# Patient Record
Sex: MALE | Race: WHITE
[De-identification: names, ages, dates, MRNs, and addresses within clinical notes are randomized per-mention and may not be internally consistent; named-entity substitution may affect disease eponyms.]

---

## 2018-02-23 ENCOUNTER — HOSPITAL ENCOUNTER (OUTPATIENT)
Dept: HOSPITAL 31 - C.SDS | Age: 53
Discharge: HOME | End: 2018-02-23
Attending: SURGERY
Payer: MEDICAID

## 2018-02-23 VITALS
TEMPERATURE: 98 F | HEART RATE: 81 BPM | DIASTOLIC BLOOD PRESSURE: 70 MMHG | RESPIRATION RATE: 16 BRPM | SYSTOLIC BLOOD PRESSURE: 100 MMHG

## 2018-02-23 VITALS — OXYGEN SATURATION: 100 %

## 2018-02-23 DIAGNOSIS — K40.20: Primary | ICD-10-CM

## 2018-02-23 PROCEDURE — 49525 REPAIR ING HERNIA SLIDING: CPT

## 2018-02-23 PROCEDURE — 64488 TAP BLOCK BI INJECTION: CPT

## 2018-02-23 RX ADMIN — BUPIVACAINE HYDROCHLORIDE ONE ML: 2.5 INJECTION, SOLUTION EPIDURAL; INFILTRATION; INTRACAUDAL; PERINEURAL at 08:10

## 2018-02-23 RX ADMIN — LIDOCAINE HYDROCHLORIDE AND EPINEPHRINE ONE ML: 10; 10 INJECTION, SOLUTION INFILTRATION; PERINEURAL at 07:52

## 2018-02-23 RX ADMIN — BUPIVACAINE HYDROCHLORIDE ONE ML: 2.5 INJECTION, SOLUTION EPIDURAL; INFILTRATION; INTRACAUDAL; PERINEURAL at 09:52

## 2018-02-23 RX ADMIN — BUPIVACAINE HYDROCHLORIDE ONE ML: 2.5 INJECTION, SOLUTION EPIDURAL; INFILTRATION; INTRACAUDAL; PERINEURAL at 08:06

## 2018-02-23 RX ADMIN — LIDOCAINE HYDROCHLORIDE AND EPINEPHRINE ONE ML: 10; 10 INJECTION, SOLUTION INFILTRATION; PERINEURAL at 08:05

## 2018-02-23 RX ADMIN — BUPIVACAINE HYDROCHLORIDE ONE ML: 2.5 INJECTION, SOLUTION EPIDURAL; INFILTRATION; INTRACAUDAL; PERINEURAL at 07:52

## 2018-02-23 RX ADMIN — BUPIVACAINE HYDROCHLORIDE ONE ML: 2.5 INJECTION, SOLUTION EPIDURAL; INFILTRATION; INTRACAUDAL; PERINEURAL at 09:55

## 2018-02-23 NOTE — [id number].[id number]
Surgeon's Initial Post Op Note





- Surgeon's Notes


Surgeon: Dr. Huang


Assistant: Merchant DEEY1, Juana HUI


Type of Anesthesia: General Endo


Pre-Operative Diagnosis: Left inguinal hernia


Operative Findings: Direct bilateral inguinal hernia. for details see op note


Post-Operative Diagnosis: left pantaloon, right direct henria


Operation Performed: bilateral robotic hernia repair with mesh


Specimen/Specimens Removed: none


Estimated Blood Loss: EBL {In ML}: 15


Drains Used: No Drains


Post-Op Condition: Good


Date of Surgery/Procedure: 02/23/18


Time of Surgery/Procedure: 11:06

## 2018-02-25 NOTE — OP
PROCEDURE DATE:  02/22/2018.



PREOPERATIVE DIAGNOSIS:  Left inguinal hernia, possible bilateral inguinal

hernia.



POSTOPERATIVE DIAGNOSES:

1.  Left direct inguinal hernia.

2.  Right direct inguinal hernia.



PROCEDURES DONE:

1.  Robotic left inguinal hernia repair with mesh.

2.  Robotic right inguinal hernia repair with mesh.

3.  Laparoscopic bilateral TAP block placement.



SURGEON:  Kevin Huang MD.



ASSISTANT:  ROSMERY David.



SECOND ASSISTANT:  Charly, PGY-1, resident.



TYPE OF ANESTHESIA:  General endotracheal tube anesthesia.



ESTIMATED BLOOD LOSS:  EBL is around 10 mL.



DRAINS:  None.



PATHOLOGY:  None.



COMPLICATIONS:  None.



INTRAOPERATIVE FINDINGS:  The patient had left direct inguinal hernia and

the patient also had a right inguinal hernia.



DESCRIPTION OF PROCEDURE:  On intraoperative step, this 52-year-old male

was diagnosed with left inguinal hernia and the patient also had pain on

the right side, the patient was consented for robotic left inguinal hernia

repair with a mesh possible bilateral, brought to the OR, placed supine on

operating table.  After induction of the anesthesia, the abdomen was

prepped and draped in the usual sterile fashion.  Vicente catheter was placed

and supraumbilical incision was made.  After incising skin and subcutaneous

tissue and the fascia, the peritoneal cavity was entered and another three

8-mm robotic port was placed and pneumo was created and robot was brought

in.  Camera arm as well as arm 1, arm 2 were docked and the patient was

placed in a Trendelenburg position before docking the robot and after that

the peritoneal incision was made from the left side to the right side and

the dissection was carried down medially to the space of edges and the

suprapubic region and laterally to the lateral abdominal wall, on the left

side first and then the dissection was carried down to reduce the hernia

back and content and the inferior dissection was done up to the peritoneal

reflection.  Now, the similar dissection was done on the right side and

lateral dissection was done, medial dissection was done and the peritoneal

sac was reduced.  Inferior dissection was done up to the peritoneal

reflection.  Now, the right and left anatomical mesh was placed and after

proper implantation of the mesh on the right side as well as the left side,

the peritoneum was sutured with 2-0 Vicryl interrupted and then 3-0 Vicryl

V-Loc PDS continuous suture.  All the instrument was taken out.  All the

robotic arm was undocked and procedure was converted to a laparoscopy. 

Now, the laparoscopic TAP block was given, first on the right side as well

as the left side.  Right side 15:15 mL of Marcaine was injected, then the

left side 15:15 mL of Marcaine was also injected.  Total 40 mL of Marcaine

with 20 mL of normal saline was injected for the TAP block and after that,

all the ports were taken under vision.  Pneumo was deflated and umbilical

port site was closed in 2 layers, fascia with 0 Vicryl interrupted suture,

skin with 4-0 Monocryl and dry sterile dressing was applied.  The patient

tolerated the procedure well.  Count of the instrument and gauze was

correct.  There was no apparent complication.







__________________________________________

Kevin Huang MD





DD:  02/24/2018 16:56:10

DT:  02/24/2018 19:33:30

Job # [id number]